# Patient Record
Sex: FEMALE | Race: BLACK OR AFRICAN AMERICAN | NOT HISPANIC OR LATINO | URBAN - METROPOLITAN AREA
[De-identification: names, ages, dates, MRNs, and addresses within clinical notes are randomized per-mention and may not be internally consistent; named-entity substitution may affect disease eponyms.]

---

## 2018-06-18 ENCOUNTER — EMERGENCY (EMERGENCY)
Facility: HOSPITAL | Age: 38
LOS: 1 days | Discharge: HOME | End: 2018-06-18
Attending: EMERGENCY MEDICINE
Payer: MEDICAID

## 2018-06-18 VITALS
WEIGHT: 125 LBS | HEART RATE: 85 BPM | OXYGEN SATURATION: 97 % | TEMPERATURE: 98 F | DIASTOLIC BLOOD PRESSURE: 73 MMHG | SYSTOLIC BLOOD PRESSURE: 120 MMHG | HEIGHT: 64.5 IN | RESPIRATION RATE: 18 BRPM

## 2018-06-18 PROBLEM — Z00.00 ENCOUNTER FOR PREVENTIVE HEALTH EXAMINATION: Status: ACTIVE | Noted: 2018-06-18

## 2018-06-18 LAB
ALBUMIN SERPL ELPH-MCNC: 3.9 G/DL — SIGNIFICANT CHANGE UP (ref 3.3–5.2)
ALP SERPL-CCNC: 89 U/L — SIGNIFICANT CHANGE UP (ref 40–120)
ALT FLD-CCNC: 14 U/L — SIGNIFICANT CHANGE UP
ANION GAP SERPL CALC-SCNC: 14 MMOL/L — SIGNIFICANT CHANGE UP (ref 5–17)
APAP SERPL-MCNC: <7.5 UG/ML — LOW (ref 10–26)
APPEARANCE UR: CLEAR — SIGNIFICANT CHANGE UP
AST SERPL-CCNC: 16 U/L — SIGNIFICANT CHANGE UP
BASOPHILS # BLD AUTO: 0 K/UL — SIGNIFICANT CHANGE UP (ref 0–0.2)
BASOPHILS NFR BLD AUTO: 0.2 % — SIGNIFICANT CHANGE UP (ref 0–2)
BILIRUB SERPL-MCNC: 0.5 MG/DL — SIGNIFICANT CHANGE UP (ref 0.4–2)
BILIRUB UR-MCNC: NEGATIVE — SIGNIFICANT CHANGE UP
BUN SERPL-MCNC: 22 MG/DL — HIGH (ref 8–20)
CALCIUM SERPL-MCNC: 9.4 MG/DL — SIGNIFICANT CHANGE UP (ref 8.6–10.2)
CHLORIDE SERPL-SCNC: 101 MMOL/L — SIGNIFICANT CHANGE UP (ref 98–107)
CO2 SERPL-SCNC: 24 MMOL/L — SIGNIFICANT CHANGE UP (ref 22–29)
COLOR SPEC: YELLOW — SIGNIFICANT CHANGE UP
CREAT SERPL-MCNC: 1.03 MG/DL — SIGNIFICANT CHANGE UP (ref 0.5–1.3)
DIFF PNL FLD: ABNORMAL
EOSINOPHIL # BLD AUTO: 0.2 K/UL — SIGNIFICANT CHANGE UP (ref 0–0.5)
EOSINOPHIL NFR BLD AUTO: 2.2 % — SIGNIFICANT CHANGE UP (ref 0–6)
EPI CELLS # UR: SIGNIFICANT CHANGE UP
ETHANOL SERPL-MCNC: <10 MG/DL — SIGNIFICANT CHANGE UP
GLUCOSE SERPL-MCNC: 104 MG/DL — SIGNIFICANT CHANGE UP (ref 70–115)
GLUCOSE UR QL: 50 MG/DL
HCG UR QL: NEGATIVE — SIGNIFICANT CHANGE UP
HCT VFR BLD CALC: 37.2 % — SIGNIFICANT CHANGE UP (ref 37–47)
HGB BLD-MCNC: 12.1 G/DL — SIGNIFICANT CHANGE UP (ref 12–16)
KETONES UR-MCNC: NEGATIVE — SIGNIFICANT CHANGE UP
LEUKOCYTE ESTERASE UR-ACNC: ABNORMAL
LYMPHOCYTES # BLD AUTO: 3.2 K/UL — SIGNIFICANT CHANGE UP (ref 1–4.8)
LYMPHOCYTES # BLD AUTO: 37.7 % — SIGNIFICANT CHANGE UP (ref 20–55)
MCHC RBC-ENTMCNC: 26.8 PG — LOW (ref 27–31)
MCHC RBC-ENTMCNC: 32.5 G/DL — SIGNIFICANT CHANGE UP (ref 32–36)
MCV RBC AUTO: 82.3 FL — SIGNIFICANT CHANGE UP (ref 81–99)
MONOCYTES # BLD AUTO: 0.7 K/UL — SIGNIFICANT CHANGE UP (ref 0–0.8)
MONOCYTES NFR BLD AUTO: 8.5 % — SIGNIFICANT CHANGE UP (ref 3–10)
NEUTROPHILS # BLD AUTO: 4.3 K/UL — SIGNIFICANT CHANGE UP (ref 1.8–8)
NEUTROPHILS NFR BLD AUTO: 51.2 % — SIGNIFICANT CHANGE UP (ref 37–73)
NITRITE UR-MCNC: NEGATIVE — SIGNIFICANT CHANGE UP
PH UR: 6 — SIGNIFICANT CHANGE UP (ref 5–8)
PLATELET # BLD AUTO: 249 K/UL — SIGNIFICANT CHANGE UP (ref 150–400)
POTASSIUM SERPL-MCNC: 3.6 MMOL/L — SIGNIFICANT CHANGE UP (ref 3.5–5.3)
POTASSIUM SERPL-SCNC: 3.6 MMOL/L — SIGNIFICANT CHANGE UP (ref 3.5–5.3)
PROT SERPL-MCNC: 7.3 G/DL — SIGNIFICANT CHANGE UP (ref 6.6–8.7)
PROT UR-MCNC: 15 MG/DL
RBC # BLD: 4.52 M/UL — SIGNIFICANT CHANGE UP (ref 4.4–5.2)
RBC # FLD: 15.6 % — SIGNIFICANT CHANGE UP (ref 11–15.6)
RBC CASTS # UR COMP ASSIST: SIGNIFICANT CHANGE UP /HPF (ref 0–4)
SALICYLATES SERPL-MCNC: <0.6 MG/DL — LOW (ref 10–20)
SODIUM SERPL-SCNC: 139 MMOL/L — SIGNIFICANT CHANGE UP (ref 135–145)
SP GR SPEC: 1.02 — SIGNIFICANT CHANGE UP (ref 1.01–1.02)
TSH SERPL-MCNC: 0.98 UIU/ML — SIGNIFICANT CHANGE UP (ref 0.27–4.2)
UROBILINOGEN FLD QL: NEGATIVE MG/DL — SIGNIFICANT CHANGE UP
WBC # BLD: 8.4 K/UL — SIGNIFICANT CHANGE UP (ref 4.8–10.8)
WBC # FLD AUTO: 8.4 K/UL — SIGNIFICANT CHANGE UP (ref 4.8–10.8)
WBC UR QL: SIGNIFICANT CHANGE UP

## 2018-06-18 PROCEDURE — 93010 ELECTROCARDIOGRAM REPORT: CPT

## 2018-06-18 PROCEDURE — 99284 EMERGENCY DEPT VISIT MOD MDM: CPT

## 2018-06-18 PROCEDURE — 90792 PSYCH DIAG EVAL W/MED SRVCS: CPT

## 2018-06-18 NOTE — ED BEHAVIORAL HEALTH ASSESSMENT NOTE - DESCRIPTION (FIRST USE, LAST USE, QUANTITY, FREQUENCY, DURATION)
reports that she has been drinking crack cocaine use--"a lot" past h/o heroine use has used margarito dust several days ago, K2 last week

## 2018-06-18 NOTE — ED ADULT TRIAGE NOTE - CHIEF COMPLAINT QUOTE
Pt sent in from  to r/o psychosis. Pt was being triaged at  for rehab due to crack cocaine use and staff there was concerned due to pt stating "I have my kids in my bag" and "my daughter is a beanie baby and bit my lip". Pt placed in yellow gown, clothing removed labeled and sent to . Dr. Caban at bedside for eval.

## 2018-06-18 NOTE — ED BEHAVIORAL HEALTH ASSESSMENT NOTE - SUMMARY
Patient is a 37  year old, female; reportedly domiciled in apartment; single; noncaregiver; patient denies past psychiatric history but states she was admitted at age 12 at [facility] for behavioral problems  ; no other known psychiatric  hospitalizations; no known suicide attempts; no known history of violence or arrests; long h/o of multiple substance use (reports she uses crack/cocaine and ETOH; no known PMH; brought in by EMS; after patient presented to [facility] for detox and was noted to be making odd statements. She was referred for evaluation psychosis.   Patient has been using ETOH/ Crack and also reports margarito dust 3 days ago and K2 1 week ago. Currently delusional, hyper Taoism, with labile mood, guarded and hypervigilant.  Appeared to be responding to internal stimuli with some disorganized speech.  UDS pending, otherwise labs unremarkable.  No collateral information available. Patient with psychosis unspecified r/o substance induced psychotic disorder.  Will hold for re evaluation in Am

## 2018-06-18 NOTE — ED BEHAVIORAL HEALTH NOTE - BEHAVIORAL HEALTH NOTE
SWNote: pt to be evaluated by psych . Worker faxed pt's info to [facility] in case bed is needed. SW to follow.

## 2018-06-18 NOTE — ED PROVIDER NOTE - PROGRESS NOTE DETAILS
Patient medically cleared. Psychiatry team will hold patient overnight. Pt seen by psych and cleared by psych and pt to be discharged to [facility] for polysubstance rehab

## 2018-06-18 NOTE — ED BEHAVIORAL HEALTH ASSESSMENT NOTE - DESCRIPTION
Patient's was guarded with labile mood.  She was clutching two stuff animals, and had holy bible and other Caodaism books arranged on seat by her bedside.  She denies any physical problems or complaints.  Urine drug screen pending.    Vital Signs Last 24 Hrs  T(C): 36.9 (18 Jun 2018 20:57), Max: 36.9 (18 Jun 2018 20:57)  T(F): 98.5 (18 Jun 2018 20:57), Max: 98.5 (18 Jun 2018 20:57)  HR: 85 (18 Jun 2018 20:57) (85 - 85)  BP: 120/73 (18 Jun 2018 20:57) (120/73 - 120/73)  BP(mean): --  RR: 18 (18 Jun 2018 20:57) (18 - 18)  SpO2: 97% (18 Jun 2018 20:57) (97% - 97%) Denies Patient reports she was adopted and recently met her biological family

## 2018-06-18 NOTE — ED BEHAVIORAL HEALTH ASSESSMENT NOTE - HPI (INCLUDE ILLNESS QUALITY, SEVERITY, DURATION, TIMING, CONTEXT, MODIFYING FACTORS, ASSOCIATED SIGNS AND SYMPTOMS)
Patient is a 37  year old, female; reportedly domiciled in apartment; single; noncaregiver; patient denies past psychiatric history but states she was admitted at age 12 at [facility] for behavioral problems  ; no other known psychiatric  hospitalizations; no known suicide attempts; no known history of violence or arrests; long h/o of multiple substance use (reports she uses crack/cocaine and ETOH; no known PMH; brought in by EMS; after patient presented to [facility] for detox and was noted to be making odd statements. She was referred for evaluation psychosis.         Patient reports that she went to [facility] today because she needed help.  She was holding to stuffed animals and kept referring to them as my kids.  She seemed to be responding to internal stimuli.  She stated that one of her kids bit her the other day.  She stated "he freaked the fuck out and bit".  Patient was guarded and hypervigilant.  She admits to using "a lot of" heroine and ETOH yesterday.  She states that she has been using daily for the last 5 months.  She states that the amounts vary.  She also admits to smoking K2 one week ago and trying dust several days ago.  Patient reports seeing the name of the creator in [place] in the clouds.  She admits to seeing biblical visions.  She reports she has been chosen by god. She sees spirits at times and god speaks to her.  She states she wants help and has been crying all the time. She "tries not to think" and has a lot of anxiety.     Patient denies any desire to hurt herself or others. She admits that she has been suicidal in the past when on drugs, but not recently.  She reports she is depressed constantly.  Patient reports poor sleep.

## 2018-06-18 NOTE — ED BEHAVIORAL HEALTH ASSESSMENT NOTE - OTHER PAST PSYCHIATRIC HISTORY (INCLUDE DETAILS REGARDING ONSET, COURSE OF ILLNESS, INPATIENT/OUTPATIENT TREATMENT)
Patient reports that she was hospitalized at age 12 for Behavioral problems. Denies any other psychiatric hospitalizations. Denies h/o suicide. Reports she has taken lexapro in the past for depression but had side effects and stopped it.  Not currently in treatment.

## 2018-06-18 NOTE — ED ADULT NURSE NOTE - OBJECTIVE STATEMENT
Pt arrived to  stating she was in [facility] and was sent here.  Pt denies SI/HI.  Pt  has two beanie babies with her stating they are her babies and one of them bit her on the lip a week ago, delusional statements stating the beanie baby bit her because she left her alone for a little while.  Pt states she smoked some crack cocaine, denies other drugs or alcohol at this time. Pt religiously preoccupied stating she seen the [facility] and sees spirits, denies A/T hallucinations.   Pt denies any psych dx or medical hx at this time.  Did say she was on Lexapro 20mg last on 2015 where [place] [facility], but denies psych hx.  Pt is non aggressive , in good spirits, complaint with  policy and procedure, security present wanded by security.  Property in  locker.

## 2018-06-18 NOTE — ED BEHAVIORAL HEALTH ASSESSMENT NOTE - RISK ASSESSMENT
Moderate-Currently psychotic, impulsive, denies any S/H I/I/P, has not been aggressive or violent, using substances, unclear prior psychiatric history.

## 2018-06-18 NOTE — ED PROVIDER NOTE - OBJECTIVE STATEMENT
A 37 year old female pt with a hx of drug and alcohol abuse presents to the ED c/o AMS, delusions. The pt was sent from [facility] earlier today after she told the staff that one of her Beanie Babies bit her in the lip. In the ED, the pt states that she knows that her beanie babies are not real, but that 2 months ago, she went to get one out of her car when it "bit her in the lip". She also states that we are in the "end times" and that more recently, she saw "the coming of the [employer]" as a plane flying straight up in the air. The pt denies any SI/HI and is currently at [facility] for detox. Pt denies any CP, SOB, abd pain, N/V/D and states that she is not aware of any psychiatric issues. No further complaints at this time. A 37 year old female pt with a hx of drug and alcohol abuse presents to the ED c/o AMS, delusions. The pt was sent from [facility] earlier today after she told the staff that one of her Beanie Babies bit her in the lip. In the ED, the pt states that she knows that her beanie babies are not real, but that 2 months ago, she went to get one out of her car when it "bit her in the lip". She also states that we are in the "end times" and that more recently, she saw "the coming of the [place]" as a plane flying straight up in the air. The pt denies any SI/HI and is currently at [facility] for detox. Pt denies any CP, SOB, abd pain, N/V/D and states that she is not aware of any psychiatric issues. No further complaints at this time.

## 2018-06-19 VITALS
HEART RATE: 72 BPM | RESPIRATION RATE: 18 BRPM | SYSTOLIC BLOOD PRESSURE: 119 MMHG | TEMPERATURE: 99 F | OXYGEN SATURATION: 98 % | DIASTOLIC BLOOD PRESSURE: 72 MMHG

## 2018-06-19 DIAGNOSIS — F29 UNSPECIFIED PSYCHOSIS NOT DUE TO A SUBSTANCE OR KNOWN PHYSIOLOGICAL CONDITION: ICD-10-CM

## 2018-06-19 LAB
AMPHET UR-MCNC: NEGATIVE — SIGNIFICANT CHANGE UP
BARBITURATES UR SCN-MCNC: NEGATIVE — SIGNIFICANT CHANGE UP
BENZODIAZ UR-MCNC: NEGATIVE — SIGNIFICANT CHANGE UP
COCAINE METAB.OTHER UR-MCNC: POSITIVE
METHADONE UR-MCNC: NEGATIVE — SIGNIFICANT CHANGE UP
OPIATES UR-MCNC: NEGATIVE — SIGNIFICANT CHANGE UP
PCP SPEC-MCNC: SIGNIFICANT CHANGE UP
PCP UR-MCNC: NEGATIVE — SIGNIFICANT CHANGE UP
THC UR QL: NEGATIVE — SIGNIFICANT CHANGE UP

## 2018-06-19 PROCEDURE — 85027 COMPLETE CBC AUTOMATED: CPT

## 2018-06-19 PROCEDURE — 93005 ELECTROCARDIOGRAM TRACING: CPT

## 2018-06-19 PROCEDURE — 36415 COLL VENOUS BLD VENIPUNCTURE: CPT

## 2018-06-19 PROCEDURE — 99284 EMERGENCY DEPT VISIT MOD MDM: CPT | Mod: 25

## 2018-06-19 PROCEDURE — 81001 URINALYSIS AUTO W/SCOPE: CPT

## 2018-06-19 PROCEDURE — 81025 URINE PREGNANCY TEST: CPT

## 2018-06-19 PROCEDURE — 80053 COMPREHEN METABOLIC PANEL: CPT

## 2018-06-19 PROCEDURE — 84443 ASSAY THYROID STIM HORMONE: CPT

## 2018-06-19 PROCEDURE — 80307 DRUG TEST PRSMV CHEM ANLYZR: CPT

## 2018-06-19 RX ORDER — HALOPERIDOL DECANOATE 100 MG/ML
5 INJECTION INTRAMUSCULAR ONCE
Qty: 0 | Refills: 0 | Status: DISCONTINUED | OUTPATIENT
Start: 2018-06-19 | End: 2018-06-23

## 2018-06-19 NOTE — ED BEHAVIORAL HEALTH NOTE - BEHAVIORAL HEALTH NOTE
chart reviewed and patient seen  evaluation by Dr Rizvi appreciated  patient heavy user of crack cocaine also  recent drinking beer and use of K2 (1 week ago)  describes drug related hallucinations Able to respond to reality orientation  Hyper-Anglican "I am very spiritual" Does not endorse any dangerous behaviors denies suicidal or violent urges Is calm and polite Interested in treatment for what she describes as depression existing along with her drug abuse Stopped prostituting self for drug money denies any physical complaints  Eager for rehab at [facility] [place] [facility]  ROS: negative  Vital Signs Last 24 Hrs  T(C): 37.1 (2018 07:50), Max: 37.1 (2018 07:50)  T(F): 98.7 (2018 07:50), Max: 98.7 (2018 07:50)  HR: 75 (2018 07:50) (73 - 85)  BP: 129/82 (2018 07:50) (109/62 - 129/82)  RR: 18 (2018 07:50) (18 - 18)  SpO2: 99% (2018 07:50) (97% - 99%)  Cocaine Metabolite, Urine: Positive (18 @ 00:42)                        12.1   8.4   )-----------( 249      ( 2018 21:51 )             37.2     -18    139  |  101  |  22.0<H>  ----------------------------<  104  3.6   |  24.0  |  1.03    Ca    9.4      2018 21:51    TPro  7.3  /  Alb  3.9  /  TBili  0.5  /  DBili  x   /  AST  16  /  ALT  14  /  AlkPhos  89  -18    LIVER FUNCTIONS - ( 2018 21:51 )  Alb: 3.9 g/dL / Pro: 7.3 g/dL / ALK PHOS: 89 U/L / ALT: 14 U/L / AST: 16 U/L / GGT: x             Urinalysis Basic - ( 2018 21:53 )    Color: Yellow / Appearance: Clear / S.025 / pH: x  Gluc: x / Ketone: Negative  / Bili: Negative / Urobili: Negative mg/dL   Blood: x / Protein: 15 mg/dL / Nitrite: Negative   Leuk Esterase: Trace / RBC: 0-2 /HPF / WBC 0-2   Sq Epi: x / Non Sq Epi: Occasional / Bacteria: x    MEDICATIONS  (STANDING):    MEDICATIONS  (PRN):  haloperidol    Injectable 5 milliGRAM(s) Intramuscular Once PRN agitation  IMPRESSION : Polysubstance abuse   Cocaine dependence  cocaine induced psychotic disorder  Does not need acute inpatient psychiatric care  Appropriate for substance abuse treatment

## 2018-06-19 NOTE — ED ADULT NURSE REASSESSMENT NOTE - NS ED NURSE REASSESS COMMENT FT1
Pt currently resting in no acute distress at this time.  Pt will stay over night for reevaluation in the morning.  Will continue to monitor and maintain safety.
pt currently resting in no acute distress at this time. Will continue to monitor and maintain safety.
Pt currently resting in no acute distress at this time.  Awaiting Psych evaluation.  Pt calm and cooperative.  Will continue to monitor and maintain safety.
Patient rec'd sleeping in bed this morning, up to speak to MD and have breakfast. Cooperative with assessment, stated she needs to go to [facility] for "a 28 day rehab". Also reports ongoing depression, which is why she states she uses crack and other street drugs. Patient stated she used to be on Lexapro, but stopped using it when she experienced side effects. Pt expressed hope that she could get help with her depression and "mental health problems" while at [facility], and also maybe get started on medication again. Se denied suicidal ideation, or thoughts to harm self/others. Also denied any a/v hallucinations. Continuing to monitor and reassess.

## 2018-06-19 NOTE — ED ADULT NURSE REASSESSMENT NOTE - COMFORT CARE
plan of care explained/po fluids offered/darkened lights/meal provided
po fluids offered/ambulated to bathroom/meal provided/darkened lights/warm blanket provided/wait time explained/plan of care explained

## 2018-06-19 NOTE — ED BEHAVIORAL HEALTH NOTE - BEHAVIORAL HEALTH NOTE
SOCIAL WORK NOTE:  THIS WORKER DISCUSSED CASE WITH DR QUICK WHOM IS CLEARING HER FOR DC TODAY.  PLACED CALL TO RAMEZ AT [facility] TO MAKE HER AWARE THAT PATIENT IS BEING CLEARED FOR DISCHARGE BUT IS BEING RECOMMENDED FOR A REHAB BED STILL.  RAMEZ AT [facility] ACCEPTING THE PATIENT UNDER THE CARE OF DR JUAN AND WILL ARRANGE TRANSPORTATION VIA AMBULANCE TO [facility] FOR 12 NOON TODAY. MADE RN AND MD AWARE OF SAME.  MET WITH PATIENT TO MAKE HER AWARE.

## 2024-05-03 ENCOUNTER — EMERGENCY (EMERGENCY)
Facility: HOSPITAL | Age: 44
LOS: 1 days | Discharge: HOME | End: 2024-05-03
Attending: STUDENT IN AN ORGANIZED HEALTH CARE EDUCATION/TRAINING PROGRAM
Payer: SELF-PAY

## 2024-05-03 VITALS
DIASTOLIC BLOOD PRESSURE: 69 MMHG | SYSTOLIC BLOOD PRESSURE: 133 MMHG | RESPIRATION RATE: 18 BRPM | TEMPERATURE: 98 F | HEART RATE: 78 BPM | OXYGEN SATURATION: 99 %

## 2024-05-03 LAB
ALBUMIN SERPL ELPH-MCNC: 4 G/DL — SIGNIFICANT CHANGE UP (ref 3.3–5.2)
ALP SERPL-CCNC: 83 U/L — SIGNIFICANT CHANGE UP (ref 40–120)
ALT FLD-CCNC: 16 U/L — SIGNIFICANT CHANGE UP
ANION GAP SERPL CALC-SCNC: 12 MMOL/L — SIGNIFICANT CHANGE UP (ref 5–17)
AST SERPL-CCNC: 19 U/L — SIGNIFICANT CHANGE UP
BASOPHILS # BLD AUTO: 0.04 K/UL — SIGNIFICANT CHANGE UP (ref 0–0.2)
BASOPHILS NFR BLD AUTO: 0.6 % — SIGNIFICANT CHANGE UP (ref 0–2)
BILIRUB SERPL-MCNC: <0.2 MG/DL — LOW (ref 0.4–2)
BUN SERPL-MCNC: 10.9 MG/DL — SIGNIFICANT CHANGE UP (ref 8–20)
CALCIUM SERPL-MCNC: 8.6 MG/DL — SIGNIFICANT CHANGE UP (ref 8.4–10.5)
CHLORIDE SERPL-SCNC: 103 MMOL/L — SIGNIFICANT CHANGE UP (ref 96–108)
CO2 SERPL-SCNC: 22 MMOL/L — SIGNIFICANT CHANGE UP (ref 22–29)
CREAT SERPL-MCNC: 0.79 MG/DL — SIGNIFICANT CHANGE UP (ref 0.5–1.3)
EGFR: 94 ML/MIN/1.73M2 — SIGNIFICANT CHANGE UP
EOSINOPHIL # BLD AUTO: 0.54 K/UL — HIGH (ref 0–0.5)
EOSINOPHIL NFR BLD AUTO: 7.9 % — HIGH (ref 0–6)
GLUCOSE SERPL-MCNC: 112 MG/DL — HIGH (ref 70–99)
HCG SERPL-ACNC: <4 MIU/ML — SIGNIFICANT CHANGE UP
HCT VFR BLD CALC: 34.4 % — LOW (ref 34.5–45)
HGB BLD-MCNC: 10.8 G/DL — LOW (ref 11.5–15.5)
IMM GRANULOCYTES NFR BLD AUTO: 0.3 % — SIGNIFICANT CHANGE UP (ref 0–0.9)
LYMPHOCYTES # BLD AUTO: 2.12 K/UL — SIGNIFICANT CHANGE UP (ref 1–3.3)
LYMPHOCYTES # BLD AUTO: 31 % — SIGNIFICANT CHANGE UP (ref 13–44)
MCHC RBC-ENTMCNC: 24.9 PG — LOW (ref 27–34)
MCHC RBC-ENTMCNC: 31.4 GM/DL — LOW (ref 32–36)
MCV RBC AUTO: 79.4 FL — LOW (ref 80–100)
MONOCYTES # BLD AUTO: 0.54 K/UL — SIGNIFICANT CHANGE UP (ref 0–0.9)
MONOCYTES NFR BLD AUTO: 7.9 % — SIGNIFICANT CHANGE UP (ref 2–14)
NEUTROPHILS # BLD AUTO: 3.58 K/UL — SIGNIFICANT CHANGE UP (ref 1.8–7.4)
NEUTROPHILS NFR BLD AUTO: 52.3 % — SIGNIFICANT CHANGE UP (ref 43–77)
PLATELET # BLD AUTO: 275 K/UL — SIGNIFICANT CHANGE UP (ref 150–400)
POTASSIUM SERPL-MCNC: 3.8 MMOL/L — SIGNIFICANT CHANGE UP (ref 3.5–5.3)
POTASSIUM SERPL-SCNC: 3.8 MMOL/L — SIGNIFICANT CHANGE UP (ref 3.5–5.3)
PROT SERPL-MCNC: 6.8 G/DL — SIGNIFICANT CHANGE UP (ref 6.6–8.7)
RBC # BLD: 4.33 M/UL — SIGNIFICANT CHANGE UP (ref 3.8–5.2)
RBC # FLD: 17.5 % — HIGH (ref 10.3–14.5)
SODIUM SERPL-SCNC: 137 MMOL/L — SIGNIFICANT CHANGE UP (ref 135–145)
WBC # BLD: 6.84 K/UL — SIGNIFICANT CHANGE UP (ref 3.8–10.5)
WBC # FLD AUTO: 6.84 K/UL — SIGNIFICANT CHANGE UP (ref 3.8–10.5)

## 2024-05-03 PROCEDURE — 80053 COMPREHEN METABOLIC PANEL: CPT

## 2024-05-03 PROCEDURE — 84702 CHORIONIC GONADOTROPIN TEST: CPT

## 2024-05-03 PROCEDURE — 99285 EMERGENCY DEPT VISIT HI MDM: CPT

## 2024-05-03 PROCEDURE — 70491 CT SOFT TISSUE NECK W/DYE: CPT | Mod: MC

## 2024-05-03 PROCEDURE — 96374 THER/PROPH/DIAG INJ IV PUSH: CPT | Mod: XU

## 2024-05-03 PROCEDURE — 87040 BLOOD CULTURE FOR BACTERIA: CPT

## 2024-05-03 PROCEDURE — 99284 EMERGENCY DEPT VISIT MOD MDM: CPT | Mod: 25

## 2024-05-03 PROCEDURE — 99284 EMERGENCY DEPT VISIT MOD MDM: CPT

## 2024-05-03 PROCEDURE — 85025 COMPLETE CBC W/AUTO DIFF WBC: CPT

## 2024-05-03 PROCEDURE — 36415 COLL VENOUS BLD VENIPUNCTURE: CPT

## 2024-05-03 PROCEDURE — 70487 CT MAXILLOFACIAL W/DYE: CPT | Mod: MC

## 2024-05-03 PROCEDURE — 70487 CT MAXILLOFACIAL W/DYE: CPT | Mod: 26,MC

## 2024-05-03 PROCEDURE — 70491 CT SOFT TISSUE NECK W/DYE: CPT | Mod: 26,MC

## 2024-05-03 RX ORDER — ACETAMINOPHEN 500 MG
975 TABLET ORAL ONCE
Refills: 0 | Status: COMPLETED | OUTPATIENT
Start: 2024-05-03 | End: 2024-05-03

## 2024-05-03 RX ORDER — IBUPROFEN 200 MG
1 TABLET ORAL
Qty: 28 | Refills: 0
Start: 2024-05-03 | End: 2024-05-09

## 2024-05-03 RX ORDER — LIDOCAINE 4 G/100G
10 CREAM TOPICAL ONCE
Refills: 0 | Status: COMPLETED | OUTPATIENT
Start: 2024-05-03 | End: 2024-05-03

## 2024-05-03 RX ADMIN — LIDOCAINE 10 MILLILITER(S): 4 CREAM TOPICAL at 03:58

## 2024-05-03 RX ADMIN — Medication 100 MILLIGRAM(S): at 04:00

## 2024-05-03 RX ADMIN — Medication 975 MILLIGRAM(S): at 04:15

## 2024-05-03 RX ADMIN — Medication 975 MILLIGRAM(S): at 03:58

## 2024-05-03 RX ADMIN — Medication 600 MILLIGRAM(S): at 04:15

## 2024-05-03 NOTE — ED PROVIDER NOTE - PHYSICAL EXAMINATION
Gen: No acute distress, non toxic  HEENT: Pink conjunctivae, EOMI. PERRL. Airway patent.   Mucous Membrane moist, Supple, FROM. No signs of nuchal rigidity, Tonsils and pharynx without erythema or exudates. Tonsils not enlarged. Uvula midline  Right inferior posterior dental fracture. Right mandible TTP, with swelling, no area of fluctuance, streaking, erythema, discharge.   CV: RRR, nl s1/s2.  Resp: CTAB, normal rate and effort. No wheezes, rhonchi, or crackles.   GI: Abdomen soft, NT, ND. No rebound, no guarding  Neuro: A&O x4, Gait intact.   MSK:  No visualized or palpable deformities.  Skin: No rashes, skin intact and well perfused. Cap refill <2sec  Psych:  Normal affect and mood

## 2024-05-03 NOTE — ED ADULT NURSE NOTE - OBJECTIVE STATEMENT
assumed care of pt from triage, pt AAOX3, resp. even and unlabored on RA, pt c/o right sided mouth/tooth pain, pt endorses "it feels like a little ball or something in there", neg. fever/chills/N/V/D/headache/dizziness/vision changes, pt took motrin and aleve PTA w/o relief

## 2024-05-03 NOTE — ED PROVIDER NOTE - OBJECTIVE STATEMENT
43 year old female PMHx polysubstance use at [facility] present to ED by EMS for tooth ache. Pt reports right lower dental infection that occurred 6 months prior, resolved with antibiotic at that time. Pt states she had 2 days of right tooth pain and swelling. pt reports taking naproxen every 6-8 hours as needed for pain without relief. Pt denies fever, chills, difficulty swallowing, throat pain, SOB, CP, abd pain, pregnancy.

## 2024-05-03 NOTE — ED PROVIDER NOTE - NSFOLLOWUPINSTRUCTIONS_ED_ALL_ED_FT
- Follow up with Dental within one week  -Return to Emergency Department for new or worsening symptoms     Dental Pain    Dental pain (toothache) may be caused by many things including tooth decay (cavities or caries), abscess or infection, or trauma. If you were prescribed an antibiotic medicine, finish all of it even if you start to feel better. Rinsing your mouth with salt water or applying ice to the painful area of your face may help with the pain. Follow up with a dentist is important in ensuring good oral health and preventing the worsening of dental disease.    SEEK IMMEDIATE MEDICAL CARE IF YOU HAVE ANY OF THE FOLLOWING SYMPTOMS: unable to open your mouth, trouble breathing or swallowing, fever, or swelling of the face, neck, or jaw.     For the Patient  Dealing with tooth pain  Do you have tooth pain? Whether it’s a short-lived,  sharp, shooting pain or a prolonged, mild ache, you  should see your dentist. (NOTE: There are other  sources of oral pain that are not discussed here, like oral sores,  jaw pain, and headaches that your dentist also may be able to  help with.)  TYPES OF PAIN  Sharp pain  You may feel a shooting pain when you eat or drink something hot or cold or sweet or sour. Pressure, like  from toothbrushing or biting, also might spark this kind of  pain.  Some things that may cause this short-lived pain reaction  include1  n a cavity;  n a cracked tooth;  n an exposed tooth root.  Any of these can leave the inner portion of your tooth,  called the pulp, unprotected. The pulp is your tooth’s nerve  and blood supply. In a healthy, undamaged tooth, the pulp is  protected by 3 outer layers: enamel, cementum, and dentin.  Enamel is the part of the tooth that you see, and it connects to  the dentin. Cementum also connects to the dentin, but it  covers the tooth root (Figure).  Things that damage the enamel, like a cavity, chip, or  crack, may cause tooth pain.  Anything that exposes the cementum also might set you up  for pain. Cementum is softer than enamel. When it is left  unprotected by the gums, it can be worn away easily.  Damage to the enamel or cementum may leave the dentin  exposed. The dentin directly connects to the pulp through  tiny tubes or canals. Researchers do not know why, but exposure  of the dentin may leave the pulp sensitive to things like changes  in temperature, certain foods and beverages, or pressure.  Dull, throbbing pain  Sometimes dental pain involves an area in or around the  mouth and jaw with a steady ache that goes on for days. This  type of pain may indicate an infection.  TREATMENT  Treatment depends on the cause of the pain you are having. Sharp pain might be caused by enamel damage like  cavities, chips, or cracks, which might call for repair. You  may need a new dental filling or crown. When the  cementum is damageddexposing the dentindtopical varnish, which goes on as a liquid and then hardens to protect  the exposed tooth roots, might be applied.  Treatment for throbbing pains also depends on what is  causing the problem. If an infection is involved, your dentist  will work to identify the source. Once that has been narrowed  down, he or she can look at options like removing the infected  tissue (for example, with a root canal).  CONCLUSION  Tooth pain can be experienced in different ways. Taking care  of the problem requires finding the reason for the pain. n

## 2024-05-03 NOTE — ED ADULT TRIAGE NOTE - CHIEF COMPLAINT QUOTE
Pt BIBEMS from [facility] for right sided mouth pain, pt believes to have an abscess and having right sided tooth pain. Pt choosing not to provide any other information d/t "pain"

## 2024-05-03 NOTE — ED PROVIDER NOTE - CLINICAL SUMMARY MEDICAL DECISION MAKING FREE TEXT BOX
43 year old female PMHx polysubstance use at [facility] present to ED by EMS for tooth ache. Pt reports right lower dental infection that occurred 6 months prior, resolved with antibiotic at that time. Pt states she had 2 days of right tooth pain and swelling. pt reports taking naproxen every 6-8 hours as needed for pain without relief. Pt denies fever, chills, difficulty swallowing, throat pain, SOB, CP, abd pain, pregnancy.   Labs, meds, CT, re-assess 43 year old female PMHx polysubstance use at [facility] present to ED by EMS for tooth ache. Pt reports right lower dental infection that occurred 6 months prior, resolved with antibiotic at that time. Pt states she had 2 days of right tooth pain and swelling. pt reports taking naproxen every 6-8 hours as needed for pain without relief. Pt denies fever, chills, difficulty swallowing, throat pain, SOB, CP, abd pain, pregnancy.   Labs, meds, CT, re-assess    Pt reassessed, pt feeling better at this time, vss, pt able to walk, talk and vocalized plan of action. Discussed in depth and explained to pt in depth the next steps that need to be taken including proper follow up with PCP or specialists. All incidental findings were discussed with pt as well. Pt verbalized their concerns and all questions were answered. Pt understands dispo and wants discharge. Given good instructions when to return to ED, strict return precautions and importance of f/u.

## 2024-05-03 NOTE — ED ADULT NURSE NOTE - NSFALLRISK_ED_ALL_ED
[General Appearance - Well Developed] : well developed [Normal Appearance] : normal appearance [Well Groomed] : well groomed [General Appearance - Well Nourished] : well nourished [No Deformities] : no deformities [General Appearance - In No Acute Distress] : no acute distress [Normal Conjunctiva] : the conjunctiva exhibited no abnormalities [Eyelids - No Xanthelasma] : the eyelids demonstrated no xanthelasmas [Normal Oral Mucosa] : normal oral mucosa [No Oral Pallor] : no oral pallor [No Oral Cyanosis] : no oral cyanosis [Normal Jugular Venous A Waves Present] : normal jugular venous A waves present [Normal Jugular Venous V Waves Present] : normal jugular venous V waves present [No Jugular Venous Shepherd A Waves] : no jugular venous shepherd A waves [Respiration, Rhythm And Depth] : normal respiratory rhythm and effort [Exaggerated Use Of Accessory Muscles For Inspiration] : no accessory muscle use [Auscultation Breath Sounds / Voice Sounds] : lungs were clear to auscultation bilaterally [Heart Sounds] : normal S1 and S2 [Murmurs] : no murmurs present [FreeTextEntry1] : irregular, irregular [Abdomen Soft] : soft [Abdomen Tenderness] : non-tender [Abdomen Mass (___ Cm)] : no abdominal mass palpated [Abnormal Walk] : normal gait [Gait - Sufficient For Exercise Testing] : the gait was sufficient for exercise testing No [Nail Clubbing] : no clubbing of the fingernails [Cyanosis, Localized] : no localized cyanosis [Petechial Hemorrhages (___cm)] : no petechial hemorrhages [Skin Color & Pigmentation] : normal skin color and pigmentation [] : no rash [No Venous Stasis] : no venous stasis [Skin Lesions] : no skin lesions [No Skin Ulcers] : no skin ulcer [No Xanthoma] : no  xanthoma was observed

## 2024-05-03 NOTE — ED PROVIDER NOTE - PATIENT PORTAL LINK FT
You can access the FollowMyHealth Patient Portal offered by [facility] by registering at the following website: [email or web address]. By joining [employer]’s FollowMyHealth portal, you will also be able to view your health information using other applications (apps) compatible with our system.

## 2024-05-08 LAB
CULTURE RESULTS: SIGNIFICANT CHANGE UP
CULTURE RESULTS: SIGNIFICANT CHANGE UP
SPECIMEN SOURCE: SIGNIFICANT CHANGE UP
SPECIMEN SOURCE: SIGNIFICANT CHANGE UP

## 2024-05-16 ENCOUNTER — OFFICE (OUTPATIENT)
Dept: URBAN - METROPOLITAN AREA CLINIC 94 | Facility: CLINIC | Age: 44
Setting detail: OPHTHALMOLOGY
End: 2024-05-16
Payer: MEDICAID

## 2024-05-16 ENCOUNTER — RX ONLY (RX ONLY)
Age: 44
End: 2024-05-16

## 2024-05-16 DIAGNOSIS — H01.004: ICD-10-CM

## 2024-05-16 DIAGNOSIS — H16.222: ICD-10-CM

## 2024-05-16 DIAGNOSIS — H16.223: ICD-10-CM

## 2024-05-16 DIAGNOSIS — H01.001: ICD-10-CM

## 2024-05-16 DIAGNOSIS — H43.393: ICD-10-CM

## 2024-05-16 DIAGNOSIS — H01.005: ICD-10-CM

## 2024-05-16 DIAGNOSIS — H01.002: ICD-10-CM

## 2024-05-16 PROBLEM — H10.45 ALLERGIC CONJUNCTIVITIS ; BOTH EYES: Status: ACTIVE | Noted: 2024-05-16

## 2024-05-16 PROCEDURE — 83861 MICROFLUID ANALY TEARS: CPT | Mod: QW | Performed by: OPHTHALMOLOGY

## 2024-05-16 PROCEDURE — 92250 FUNDUS PHOTOGRAPHY W/I&R: CPT | Performed by: OPHTHALMOLOGY

## 2024-05-16 PROCEDURE — 92004 COMPRE OPH EXAM NEW PT 1/>: CPT | Performed by: OPHTHALMOLOGY

## 2024-05-16 PROCEDURE — 83861 MICROFLUID ANALY TEARS: CPT | Mod: LT | Performed by: OPHTHALMOLOGY

## 2024-05-16 ASSESSMENT — CONFRONTATIONAL VISUAL FIELD TEST (CVF)
OD_FINDINGS: FULL
OS_FINDINGS: FULL

## 2024-05-17 PROBLEM — H01.001 BLEPHARITIS; RIGHT UPPER LID, RIGHT LOWER LID, LEFT UPPER LID, LEFT LOWER LID: Status: ACTIVE | Noted: 2024-05-16

## 2024-05-17 PROBLEM — H16.223 DRY EYE SYNDROME K SICCA; RIGHT EYE, LEFT EYE, BOTH EYES: Status: ACTIVE | Noted: 2024-05-16

## 2024-05-17 PROBLEM — H43.393 VITREOUS FLOATERS; BOTH EYES: Status: ACTIVE | Noted: 2024-05-16

## 2024-05-17 PROBLEM — H01.004 BLEPHARITIS; RIGHT UPPER LID, RIGHT LOWER LID, LEFT UPPER LID, LEFT LOWER LID: Status: ACTIVE | Noted: 2024-05-16

## 2024-05-17 PROBLEM — H16.221 DRY EYE SYNDROME K SICCA; RIGHT EYE, LEFT EYE, BOTH EYES: Status: ACTIVE | Noted: 2024-05-16

## 2024-05-17 PROBLEM — H16.222 DRY EYE SYNDROME K SICCA; RIGHT EYE, LEFT EYE, BOTH EYES: Status: ACTIVE | Noted: 2024-05-16

## 2024-05-17 PROBLEM — H01.005 BLEPHARITIS; RIGHT UPPER LID, RIGHT LOWER LID, LEFT UPPER LID, LEFT LOWER LID: Status: ACTIVE | Noted: 2024-05-16

## 2024-05-17 PROBLEM — H01.002 BLEPHARITIS; RIGHT UPPER LID, RIGHT LOWER LID, LEFT UPPER LID, LEFT LOWER LID: Status: ACTIVE | Noted: 2024-05-16

## 2024-05-17 ASSESSMENT — LID EXAM ASSESSMENTS
OD_BLEPHARITIS: RLL RUL T
OS_BLEPHARITIS: LLL LUL T

## 2024-06-20 ENCOUNTER — OFFICE (OUTPATIENT)
Dept: URBAN - METROPOLITAN AREA CLINIC 94 | Facility: CLINIC | Age: 44
Setting detail: OPHTHALMOLOGY
End: 2024-06-20
Payer: MEDICAID

## 2024-06-20 DIAGNOSIS — H01.005: ICD-10-CM

## 2024-06-20 DIAGNOSIS — H01.002: ICD-10-CM

## 2024-06-20 DIAGNOSIS — H01.004: ICD-10-CM

## 2024-06-20 DIAGNOSIS — H01.001: ICD-10-CM

## 2024-06-20 DIAGNOSIS — H52.4: ICD-10-CM

## 2024-06-20 DIAGNOSIS — H10.45: ICD-10-CM

## 2024-06-20 DIAGNOSIS — H43.393: ICD-10-CM

## 2024-06-20 DIAGNOSIS — H16.223: ICD-10-CM

## 2024-06-20 PROBLEM — H16.221 DRY EYE SYNDROME K SICCA; RIGHT EYE, LEFT EYE, BOTH EYES: Status: ACTIVE | Noted: 2024-06-20

## 2024-06-20 PROBLEM — H16.222 DRY EYE SYNDROME K SICCA; RIGHT EYE, LEFT EYE, BOTH EYES: Status: ACTIVE | Noted: 2024-06-20

## 2024-06-20 PROCEDURE — 95004 PERQ TESTS W/ALRGNC XTRCS: CPT | Performed by: OPHTHALMOLOGY

## 2024-06-20 PROCEDURE — 92015 DETERMINE REFRACTIVE STATE: CPT | Performed by: OPHTHALMOLOGY

## 2024-06-20 PROCEDURE — 92014 COMPRE OPH EXAM EST PT 1/>: CPT | Performed by: OPHTHALMOLOGY

## 2024-06-20 ASSESSMENT — CONFRONTATIONAL VISUAL FIELD TEST (CVF)
OD_FINDINGS: FULL
OS_FINDINGS: FULL

## 2024-06-20 ASSESSMENT — LID EXAM ASSESSMENTS
OS_BLEPHARITIS: LLL LUL T
OD_BLEPHARITIS: RLL RUL T

## 2024-06-21 ENCOUNTER — APPOINTMENT (OUTPATIENT)
Dept: GASTROENTEROLOGY | Facility: CLINIC | Age: 44
End: 2024-06-21

## 2024-06-21 VITALS
BODY MASS INDEX: 28.68 KG/M2 | DIASTOLIC BLOOD PRESSURE: 85 MMHG | HEIGHT: 64 IN | OXYGEN SATURATION: 97 % | HEART RATE: 81 BPM | SYSTOLIC BLOOD PRESSURE: 131 MMHG | TEMPERATURE: 97.6 F | RESPIRATION RATE: 14 BRPM | WEIGHT: 168 LBS

## 2024-06-21 DIAGNOSIS — F50.89 OTHER SPECIFIED EATING DISORDER: ICD-10-CM

## 2024-06-21 DIAGNOSIS — D64.9 ANEMIA, UNSPECIFIED: ICD-10-CM

## 2024-06-21 DIAGNOSIS — Z71.9 COUNSELING, UNSPECIFIED: ICD-10-CM

## 2024-06-21 DIAGNOSIS — Z78.9 OTHER SPECIFIED HEALTH STATUS: ICD-10-CM

## 2024-06-21 PROCEDURE — 99204 OFFICE O/P NEW MOD 45 MIN: CPT

## 2024-06-21 RX ORDER — CLONIDINE HYDROCHLORIDE 0.3 MG/1
TABLET ORAL
Refills: 0 | Status: ACTIVE | COMMUNITY

## 2024-06-21 RX ORDER — POLYETHYLENE GLYCOL 3350 17 G/17G
17 POWDER, FOR SOLUTION ORAL
Qty: 238 | Refills: 0 | Status: ACTIVE | COMMUNITY
Start: 2024-06-21 | End: 1900-01-01

## 2024-06-21 RX ORDER — ESCITALOPRAM OXALATE 20 MG/1
20 TABLET, FILM COATED ORAL
Refills: 0 | Status: ACTIVE | COMMUNITY

## 2024-06-21 NOTE — PHYSICAL EXAM
[Alert] : alert [Normal Voice/Communication] : normal voice/communication [Healthy Appearing] : healthy appearing [No Acute Distress] : no acute distress [Sclera] : the sclera and conjunctiva were normal [Hearing Threshold Finger Rub Not [place]] : hearing was normal [Normal Appearance] : the appearance of the neck was normal [No Neck Mass] : no neck mass was observed [No Respiratory Distress] : no respiratory distress [No Acc Muscle Use] : no accessory muscle use [Respiration, Rhythm And Depth] : normal respiratory rhythm and effort [Heart Rate And Rhythm] : heart rate was normal and rhythm regular [Bowel Sounds] : normal bowel sounds [Abdomen Tenderness] : non-tender [No Masses] : no abdominal mass palpated [Abdomen Soft] : soft [Normal Color / Pigmentation] : normal skin color and pigmentation [No Focal Deficits] : no focal deficits [Oriented To Time, Place, And Person] : oriented to person, place, and time

## 2024-06-21 NOTE — ASSESSMENT
[FreeTextEntry1] : 42 yo F with PMH of anemia, pica, who presents to discuss endoscopic work up in setting of long history of ingesting tissue paper daily. She also reports long history of anemia and is of age for CRC screening. She has never had EGD or colonoscopy.  - EGD and colonoscopy to be performed for eval of anemia - Labs

## 2024-09-18 ENCOUNTER — APPOINTMENT (OUTPATIENT)
Dept: GASTROENTEROLOGY | Facility: GI CENTER | Age: 44
End: 2024-09-18